# Patient Record
Sex: FEMALE | Race: WHITE
[De-identification: names, ages, dates, MRNs, and addresses within clinical notes are randomized per-mention and may not be internally consistent; named-entity substitution may affect disease eponyms.]

---

## 2018-04-13 ENCOUNTER — HOSPITAL ENCOUNTER (OUTPATIENT)
Dept: HOSPITAL 88 - OR | Age: 58
Discharge: HOME | End: 2018-04-13
Attending: PODIATRIST
Payer: COMMERCIAL

## 2018-04-13 DIAGNOSIS — B15.9: ICD-10-CM
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
## 2018-04-13 DIAGNOSIS — T84.84XA: Primary | ICD-10-CM

## 2018-04-13 DIAGNOSIS — M20.42: ICD-10-CM

## 2018-04-13 DIAGNOSIS — M21.172: ICD-10-CM

## 2018-04-13 DIAGNOSIS — Z01.810: ICD-10-CM

## 2018-04-13 PROCEDURE — 28285 REPAIR OF HAMMERTOE: CPT

## 2018-04-13 PROCEDURE — 93005 ELECTROCARDIOGRAM TRACING: CPT

## 2018-04-13 PROCEDURE — 28296 COR HLX VLGS DSTL MTAR OSTEO: CPT

## 2018-04-13 PROCEDURE — 20680 REMOVAL OF IMPLANT DEEP: CPT

## 2018-04-13 PROCEDURE — 76001: CPT

## 2018-04-13 NOTE — OPERATIVE REPORT
DATE OF PROCEDURE:  April 13, 2018 

 

PREOPERATIVE DIAGNOSIS:  Painful hardware left foot 1st metatarsal 

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  There 

is also a mild varus deformity of the left foot.



POSTOPERATIVE DIAGNOSIS:  Painful hardware left foot 1st metatarsal 

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  There 

is also a mild varus deformity of the left foot.



TITLE OF OPERATION:  Removal of hardware left foot, arthrodesis with 0.045 

K-wires digits 2, 3, 4 and 5 left foot, and then varus repair with 

osteotomy reverse Sarthak left foot.



ANESTHESIA:  General endotracheal.



HEMOSTASIS:  A left thigh tourniquet at 350 mmHg.



PROCEDURE IN DETAIL:  The patient was taken to the operating room in a 

mildly sedated state and placed upon the operating table in the supine 

position.  Following induction of general anesthetic, the left lower 

extremity was elevated to 60 degrees to exsanguinate before inflating the 

pneumatic thigh tourniquet to 350 mmHg to create hemostasis.  The left foot 

was placed upon the operating table prior to performing the following 

procedure. Procedure number 1 is 

The hardware removal of the base of the 1st metatarsal cuneiform joint of 

the left foot.  A linear incision was placed overlying the 2 Treace plates 

at the 1st metatarsal base.  This incision was deepened via sharp and blunt 

dissection down to the level of the plates themselves, which were 

reflected, and a total of 8 screws and 2 plates were removed from the area 

utilizing the screw removal device.  The area was irrigated, and structural 

integrity was ensured.  Deep closure was 3-0 Vicryl.  Skin closure was 4-0 

nylon.  A separate incision was placed overlying the 1st 

metatarsophalangeal joint, which was noted to be malpositioned with regards 

to the sesamoid apparatus in the 1st metatarsal proximal phalanx.  A 

release of all soft tissues failed to alleviate the contracture medialward. 

 Therefore, intraoperative decision was made for a reverse Sarthak. A 

through-and-through V osteotomy was placed with the apex distally and base 

proximally, which allowed for a shift medialward of the head of the more 

proximal segment, which was then impacted and stabilized with a 0.062 

K-wire.  The area was remodeled, irrigated and closed with 3-0 Vicryl and 

4-0 nylon.  Position of the hallux was markedly improved.  Attention was 

then directed to digits 2, 3 and 4 for removal of implanted devices.  The 

linear incision at the proximal interphalangeal joint allowed for 

dissection down to the 2-step implant which was identified and, then with 

the distraction of the joint, released from the underlying tension of the 

implant itself.  The implant insertion/removal device was then used to 

unscrew the implant from the intermediate phalanx.  This having been 

accomplished, the area was irrigated with copious amounts of sterile saline 

solution.  Same was done on the other wounds.  Deep closure was 3-0 Vicryl. 

 Skin closure 4-0 nylon.  The areas of surgery were blocked with 0.5 

Marcaine, Decadron LA.  After removal, all K-wires were inserted in a 

retrograde technique to reapproximate the intermediate phalangeal joint 

fusion to the head of the proximal phalanx.  This having been accomplished 

and excellent alignment being noted, K-wires were bent and cut to their 

appropriate lengths and tendon repair was achieved and deep closure was 3-0 

Vicryl, 4-0 nylon.  At this point, the release of the pneumatic thigh 

tourniquet showed a normal hyperemic flush to all digits of the left foot, 

and the patient left the operating room with vital signs stable, in 

apparent satisfactory condition, having tolerated both the anesthetic and 

procedure very well.  











DD:  04/13/2018 12:50

DT:  04/13/2018 13:35

Job#:  [id number]

## 2018-04-13 NOTE — XMS REPORT
Patient Summary Document

 Created on: 2018



BERNADETTE CARDONA

External Reference #: [id number]

: 1960

Sex: Female



Demographics







 Address  [address] [place] [address] [address] [place]  [address]

 

 Home Phone  [phone]

 

 Preferred Language  Unknown

 

 Marital Status  Unknown

 

 Yazdanism Affiliation  Unknown

 

 Race  Unknown

 

 Additional Race(s)  

 

 Ethnic Group  Unknown





Author







 Author  [facility]

 

 Address  Unknown

 

 Phone  Unavailable







Care Team Providers







 Care Team Member Name  Role  Phone

 

 RUFINO SYKES  Unavailable  Unavailable







Problems

This patient has no known problems.



Allergies, Adverse Reactions, Alerts

This patient has no known allergies or adverse reactions.



Medications

This patient has no known medications.



Results







 Test Description  Test Time  Test Comments  Text Results  Atomic Results  
Result Comments









 CHEST 2 VIEWS            [address] [address]      Patient Name: BERNADETTE CARDONA   MR 
#: [id number]    : 1960 Age/Sex: 57/F  Acct #: [id number] Req #: [id number]
[id number]  Adm Physician:     Ordered by: RUFINO SYKES DPM  Report #: [id number]
[id number]   Location: OR  Room/Bed:     _____________________________________________
______________________________________________________    Procedure: [id number] 
DX/CHEST 2 VIEWS  Exam Date: 17                            Exam Time: 
818       REPORT STATUS: Signed    PROCEDURE:   CHEST 2 VIEWS   TECHNIQUE:   
PA and lateral chest   INDICATION:   Preoperative evaluation for foot surgery   
COMPARISON:   None.       FINDINGS:   The lungs are clear and symmetrically 
inflated. No pleural effusions.    Normal heart size and mediastinal contour, 
with mildly tortuous    thoracic aorta. Normal central vasculature. Intact 
skeleton with    degenerative disc disease.           CONCLUSION:   Normal 
chest for age.               Dictated by:  Ortiz Lamas M.D. on 2017 at 8:43        Electronically approved by:  Ortiz Lamas M.D. on 2017 at    8:43                Dictated By: ORTIZ LAMAS MD  
Electronically Signed By: ORTIZ LAMAS MD on 17  Transcribed By: 
SADI on 17       COPY TO:   RUFINO SYKES DPM